# Patient Record
Sex: FEMALE | Race: WHITE | NOT HISPANIC OR LATINO | Employment: PART TIME | URBAN - METROPOLITAN AREA
[De-identification: names, ages, dates, MRNs, and addresses within clinical notes are randomized per-mention and may not be internally consistent; named-entity substitution may affect disease eponyms.]

---

## 2021-08-31 ENCOUNTER — EVALUATION (OUTPATIENT)
Dept: PHYSICAL THERAPY | Age: 56
End: 2021-08-31
Payer: COMMERCIAL

## 2021-08-31 DIAGNOSIS — G89.4 CHRONIC PAIN SYNDROME: Primary | ICD-10-CM

## 2021-08-31 PROCEDURE — 97161 PT EVAL LOW COMPLEX 20 MIN: CPT | Performed by: PHYSICAL THERAPIST

## 2021-08-31 NOTE — LETTER
2021    Lillie Jewell MD  [address] [place] [address] [address] [facility]    Patient: Avanell Moritz   YOB: 1965   Date of Visit: 2021     Encounter Diagnosis     ICD-10-CM    1  Chronic pain syndrome  G89 4        Dear Dr Petar Larkin: Thank you for your recent referral of Avanell Moritz  Please review the attached evaluation summary from Treva's recent visit  Please verify that you agree with the plan of care by signing the attached order  If you have any questions or concerns, please do not hesitate to call  I sincerely appreciate the opportunity to share in the care of one of your patients and hope to have another opportunity to work with you in the near future  Sincerely,    Pelon Hernandez, PT      Referring Provider:      I certify that I have read the below Plan of Care and certify the need for these services furnished under this plan of treatment while under my care  Lillie Jewell MD  [address] [place] [id number]  Via Fax: [phone]          PT Evaluation     Today's date: 2021  Patient name: Avanell Moritz  : 1965  MRN: [id number]  Referring provider: Judd Islas MD  Dx:   Encounter Diagnosis     ICD-10-CM    1  Chronic pain syndrome  G89 4                   Assessment  Assessment details: Pt reports to PT with cc of diffuse, chronic pain in lumbar, cervical B knee, feet and hand regions  Pt has decreased LE strength and lumbar ROM and would benefit from skilled PT in order to decreased difficulty with ADLs     Impairments: abnormal coordination, abnormal muscle firing, abnormal muscle tone, abnormal or restricted ROM, activity intolerance, impaired physical strength, lacks appropriate home exercise program, pain with function and poor body mechanics    Goals  In 4 weeks pt will:  -Be independent with phase I of HEP  -Increase LE strength by 1/2 grade  -Increase LE ROM by 10 degrees    By discharge pt will:  -Be independent with Phase II of HEP  -Demonstrate full LE strength  -Demonstrate full LE ROM  -Report minimal pain with ADLs    Plan  Patient would benefit from: skilled physical therapy  Planned therapy interventions: joint mobilization, manual therapy, abdominal trunk stabilization, motor coordination training, muscle pump exercises, aquatic therapy, neuromuscular re-education, therapeutic activities, therapeutic exercise, stretching, strengthening, patient education, functional ROM exercises and home exercise program  Frequency: 1x week  Duration in weeks: 6  Plan of Care beginning date: 8/31/2021  Plan of Care expiration date: 10/12/2021  Treatment plan discussed with: patient and PTA        Subjective Evaluation    History of Present Illness  Mechanism of injury: Pt reports to PT with cc of diffuse, chronic pain in lumbar, cervical B knee, feet and hand regions  Pt has overall difficulty with ADLs, including work at            Objective     Passive Range of Motion     Additional Passive Range of Motion Details  Lumbar ROM as % of normal ROM    Flex:75  Ext:50  R ROT:50  L ROT:50      Strength/Myotome Testing     Left Hip   Planes of Motion   Abduction: 3+    Right Hip   Planes of Motion   Abduction: 3+    Left Knee   Extension: 4    Right Knee   Extension: 4    Left Ankle/Foot   Dorsiflexion: 3+  Plantar flexion: 3+    Right Ankle/Foot   Dorsiflexion: 3+  Plantar flexion: 3+             Precautions: Chronic pain      Manuals                                                                 Neuro Re-Ed                                                                                                        Ther Ex                                                                                                                     Ther Activity                                       Gait Training                                       Modalities

## 2021-08-31 NOTE — PROGRESS NOTES
PT Evaluation     Today's date: 2021  Patient name: Robin Love  : 1965  MRN: [id number]  Referring provider: Jatinder Oakley MD  Dx:   Encounter Diagnosis     ICD-10-CM    1  Chronic pain syndrome  G89 4                   Assessment  Assessment details: Pt reports to PT with cc of diffuse, chronic pain in lumbar, cervical B knee, feet and hand regions  Pt has decreased LE strength and lumbar ROM and would benefit from skilled PT in order to decreased difficulty with ADLs  Impairments: abnormal coordination, abnormal muscle firing, abnormal muscle tone, abnormal or restricted ROM, activity intolerance, impaired physical strength, lacks appropriate home exercise program, pain with function and poor body mechanics    Goals  In 4 weeks pt will:  -Be independent with phase I of HEP  -Increase LE strength by 1/2 grade  -Increase LE ROM by 10 degrees    By discharge pt will:  -Be independent with Phase II of HEP  -Demonstrate full LE strength  -Demonstrate full LE ROM  -Report minimal pain with ADLs    Plan  Patient would benefit from: skilled physical therapy  Planned therapy interventions: joint mobilization, manual therapy, abdominal trunk stabilization, motor coordination training, muscle pump exercises, aquatic therapy, neuromuscular re-education, therapeutic activities, therapeutic exercise, stretching, strengthening, patient education, functional ROM exercises and home exercise program  Frequency: 1x week  Duration in weeks: 6  Plan of Care beginning date: 2021  Plan of Care expiration date: 10/12/2021  Treatment plan discussed with: patient and PTA        Subjective Evaluation    History of Present Illness  Mechanism of injury: Pt reports to PT with cc of diffuse, chronic pain in lumbar, cervical B knee, feet and hand regions  Pt has overall difficulty with ADLs, including work at            Objective     Passive Range of Motion     Additional Passive Range of Motion Details  Lumbar ROM as % of normal ROM    Flex:75  Ext:50  R ROT:50  L ROT:50      Strength/Myotome Testing     Left Hip   Planes of Motion   Abduction: 3+    Right Hip   Planes of Motion   Abduction: 3+    Left Knee   Extension: 4    Right Knee   Extension: 4    Left Ankle/Foot   Dorsiflexion: 3+  Plantar flexion: 3+    Right Ankle/Foot   Dorsiflexion: 3+  Plantar flexion: 3+             Precautions: Chronic pain      Manuals                                                                 Neuro Re-Ed                                                                                                        Ther Ex                                                                                                                     Ther Activity                                       Gait Training                                       Modalities

## 2021-09-09 ENCOUNTER — OFFICE VISIT (OUTPATIENT)
Dept: PHYSICAL THERAPY | Age: 56
End: 2021-09-09
Payer: COMMERCIAL

## 2021-09-09 DIAGNOSIS — G89.4 CHRONIC PAIN SYNDROME: Primary | ICD-10-CM

## 2021-09-09 PROCEDURE — 97113 AQUATIC THERAPY/EXERCISES: CPT | Performed by: PHYSICAL THERAPIST

## 2021-09-09 NOTE — PROGRESS NOTES
Daily Note     Today's date: 2021  Patient name: Alayna Whalen  : 1965  MRN: [id number]  Referring provider: Court Goncalves MD  Dx:   Encounter Diagnosis     ICD-10-CM    1  Chronic pain syndrome  G89 4                   Subjective: Pt reports minimal changes from IE      Objective: See treatment diary below      Assessment: Tolerated treatment well  Patient demonstrated fatigue post treatment, would benefit from continued PT and pt tolerated first treatment well, but stated she had soreness in B hips following session      Plan: Continue per plan of care  Progress treatment as tolerated         Precautions: Chronic pain      Manuals                                                                 Neuro Re-Ed                                                                                                        Ther Ex POOL            Laps 5/5            HS/piriformis stretch 5x30s ea            Hip flex/abd  2x10 ea            HR 2x10            Pool pony bike 5'            Ball press down 5" x10 L/R/C            Turtle flex  x10 L/R/C                                                                                                       Ther Activity                                       Gait Training                                       Modalities

## 2021-09-14 ENCOUNTER — OFFICE VISIT (OUTPATIENT)
Dept: PHYSICAL THERAPY | Age: 56
End: 2021-09-14
Payer: COMMERCIAL

## 2021-09-14 DIAGNOSIS — G89.4 CHRONIC PAIN SYNDROME: Primary | ICD-10-CM

## 2021-09-14 PROCEDURE — 97113 AQUATIC THERAPY/EXERCISES: CPT | Performed by: SPECIALIST/TECHNOLOGIST

## 2021-09-14 NOTE — PROGRESS NOTES
Daily Note     Today's date: 2021  Patient name: Katherine Ortega  : 1965  MRN: [id number]  Referring provider: Sally Pires MD  Dx:   Encounter Diagnosis     ICD-10-CM    1  Chronic pain syndrome  G89 4                   Subjective: Pt reports she was fairly sore in B hips  following previous tx session that lasted 2-3 days  Pt also states she had a follow up with rheumatology and she was formally diagnosed with RA and received hip injections which have provided some relief  Objective: See treatment diary below    Assessment: No LE progressions to POC this visit given pt's reported soreness following initial treatment session  Tolerated treatment well  Patient demonstrated fatigue post treatment and exhibited good technique with therapeutic exercises and would benefit from continued PT  Plan: Continue per plan of care  Progress treatment as tolerated         Precautions: Chronic pain      Manuals                                                                Neuro Re-Ed                                                                                                        Ther Ex POOL POOL           Laps            HS/piriformis stretch 5x30s ea 5x30s           Hip flex/abd  2x10 ea 2x10 ea           HR 2x10 20x            Pool pony bike 5' 5 min           Ball press down 5" x10 L/R/C 5" x15 L/R/C           Turtle flex  x10 L/R/C x20 L/R/C                        Paddle Biking  2 min FW/BW           4 Way UE Paddles  20x ea                                                               Ther Activity                                       Gait Training                                       Modalities

## 2021-09-21 ENCOUNTER — OFFICE VISIT (OUTPATIENT)
Dept: PHYSICAL THERAPY | Age: 56
End: 2021-09-21
Payer: COMMERCIAL

## 2021-09-21 DIAGNOSIS — G89.4 CHRONIC PAIN SYNDROME: Primary | ICD-10-CM

## 2021-09-21 PROCEDURE — 97113 AQUATIC THERAPY/EXERCISES: CPT | Performed by: PHYSICAL THERAPIST

## 2021-09-21 NOTE — PROGRESS NOTES
Daily Note     Today's date: 2021  Patient name: Lara Villalobos  : 1965  MRN: [id number]  Referring provider: Yaya Heard MD  Dx:   Encounter Diagnosis     ICD-10-CM    1  Chronic pain syndrome  G89 4                   Subjective: Pt reports overall decrease in pain from IE      Objective: See treatment diary below      Assessment: Tolerated treatment well  Patient demonstrated fatigue post treatment and would benefit from continued PT      Plan: Continue per plan of care  Progress treatment as tolerated         Precautions: Chronic pain      Manuals                                                               Neuro Re-Ed                                                                                                        Ther Ex POOL POOL           Laps           HS/piriformis stretch 5x30s ea 5x30s 5x30s          Hip flex/abd  2x10 ea 2x10 ea           HR 2x10 20x  20x          Pool pony bike 5' 5 min 5'          Ball press down 5" x10 L/R/C 5" x15 L/R/C 5" x15 L/R/C          Turtle flex  x10 L/R/C x20 L/R/C x20 L/R/C                       Paddle Biking  2 min FW/BW 2 min FW/BW          4 Way UE Paddles  20x ea                                                               Ther Activity                                       Gait Training                                       Modalities

## 2021-09-28 ENCOUNTER — OFFICE VISIT (OUTPATIENT)
Dept: PHYSICAL THERAPY | Age: 56
End: 2021-09-28
Payer: COMMERCIAL

## 2021-09-28 DIAGNOSIS — G89.4 CHRONIC PAIN SYNDROME: Primary | ICD-10-CM

## 2021-09-28 PROCEDURE — 97113 AQUATIC THERAPY/EXERCISES: CPT | Performed by: SPECIALIST/TECHNOLOGIST

## 2021-09-28 NOTE — PROGRESS NOTES
Daily Note     Today's date: 2021  Patient name: Dipika Petersen  : 1965  MRN: [id number]  Referring provider: Zak Nicole MD  Dx:   Encounter Diagnosis     ICD-10-CM    1  Chronic pain syndrome  G89 4                   Subjective: Pt reports B hip soreness as she has been going on more walks  Objective: See treatment diary below    Assessment: No progressions with LE therex given reported soreness  Tolerated treatment well  Patient demonstrated fatigue post treatment, exhibited good technique with therapeutic exercises and would benefit from continued PT    Plan: Continue per plan of care  Progress treatment as tolerated         Precautions: Chronic pain      Manuals                                                              Neuro Re-Ed                                                                                                        Ther Ex POOL POOL           Laps          HS/piriformis stretch 5x30s ea 5x30s 5x30s 5x30s         Hip flex/abd  2x10 ea 2x10 ea  20x ea         HR 2x10 20x  20x 20x         Pool pony bike 5' 5 min 5' 5'         Ball press down 5" x10 L/R/C 5" x15 L/R/C 5" x15 L/R/C 5" x20         Turtle flex  x10 L/R/C x20 L/R/C x20 L/R/C x20 L/R/C                      Paddle Biking  2 min FW/BW 2 min FW/BW 2 min FW/BW         4 Way UE Paddles  20x ea  30x ea                                                             Ther Activity                                       Gait Training                                       Modalities

## 2021-10-05 ENCOUNTER — OFFICE VISIT (OUTPATIENT)
Dept: PHYSICAL THERAPY | Age: 56
End: 2021-10-05
Payer: COMMERCIAL

## 2021-10-05 DIAGNOSIS — G89.4 CHRONIC PAIN SYNDROME: Primary | ICD-10-CM

## 2021-10-05 PROCEDURE — 97113 AQUATIC THERAPY/EXERCISES: CPT | Performed by: PHYSICAL THERAPIST

## 2021-10-12 ENCOUNTER — OFFICE VISIT (OUTPATIENT)
Dept: PHYSICAL THERAPY | Age: 56
End: 2021-10-12
Payer: COMMERCIAL

## 2021-10-12 DIAGNOSIS — G89.4 CHRONIC PAIN SYNDROME: Primary | ICD-10-CM

## 2021-10-12 PROCEDURE — 97113 AQUATIC THERAPY/EXERCISES: CPT | Performed by: SPECIALIST/TECHNOLOGIST

## 2021-10-19 ENCOUNTER — OFFICE VISIT (OUTPATIENT)
Dept: PHYSICAL THERAPY | Age: 56
End: 2021-10-19
Payer: COMMERCIAL

## 2021-10-19 DIAGNOSIS — G89.4 CHRONIC PAIN SYNDROME: Primary | ICD-10-CM

## 2021-10-19 PROCEDURE — 97113 AQUATIC THERAPY/EXERCISES: CPT | Performed by: PHYSICAL THERAPIST

## 2021-12-20 ENCOUNTER — PROBLEM (OUTPATIENT)
Dept: URBAN - METROPOLITAN AREA CLINIC 6 | Facility: CLINIC | Age: 56
End: 2021-12-20

## 2021-12-20 DIAGNOSIS — H16.223: ICD-10-CM

## 2021-12-20 DIAGNOSIS — H25.13: ICD-10-CM

## 2021-12-20 PROCEDURE — 1036F TOBACCO NON-USER: CPT

## 2021-12-20 PROCEDURE — 92004 COMPRE OPH EXAM NEW PT 1/>: CPT

## 2021-12-20 PROCEDURE — G8427 DOCREV CUR MEDS BY ELIG CLIN: HCPCS

## 2021-12-20 ASSESSMENT — TONOMETRY
OD_IOP_MMHG: 15
OS_IOP_MMHG: 15

## 2021-12-20 ASSESSMENT — VISUAL ACUITY
OU_SC: J3
OD_CC: 20/20
OS_CC: 20/30+2

## 2022-03-21 ENCOUNTER — FOLLOW UP (OUTPATIENT)
Dept: URBAN - METROPOLITAN AREA CLINIC 6 | Facility: CLINIC | Age: 57
End: 2022-03-21

## 2022-03-21 DIAGNOSIS — H16.223: ICD-10-CM

## 2022-03-21 DIAGNOSIS — H25.13: ICD-10-CM

## 2022-03-21 PROCEDURE — 92012 INTRM OPH EXAM EST PATIENT: CPT

## 2022-03-21 ASSESSMENT — VISUAL ACUITY
OU_CC: J3
OD_CC: 20/20
OS_CC: 20/25

## 2022-03-21 ASSESSMENT — TONOMETRY
OS_IOP_MMHG: 22
OD_IOP_MMHG: 20

## 2022-09-14 ENCOUNTER — 6 MONTH FOLLOW UP (OUTPATIENT)
Dept: URBAN - METROPOLITAN AREA CLINIC 6 | Facility: CLINIC | Age: 57
End: 2022-09-14

## 2022-09-14 DIAGNOSIS — D31.31: ICD-10-CM

## 2022-09-14 DIAGNOSIS — H25.13: ICD-10-CM

## 2022-09-14 DIAGNOSIS — H16.223: ICD-10-CM

## 2022-09-14 PROCEDURE — 92201 OPSCPY EXTND RTA DRAW UNI/BI: CPT

## 2022-09-14 PROCEDURE — 92014 COMPRE OPH EXAM EST PT 1/>: CPT

## 2022-09-14 ASSESSMENT — TONOMETRY
OS_IOP_MMHG: 18
OD_IOP_MMHG: 17

## 2022-09-14 ASSESSMENT — VISUAL ACUITY
OD_SC: 20/20
OU_SC: J1
OS_SC: 20/30-2

## 2023-09-18 ENCOUNTER — FOLLOW UP (OUTPATIENT)
Dept: URBAN - METROPOLITAN AREA CLINIC 6 | Facility: CLINIC | Age: 58
End: 2023-09-18

## 2023-09-18 DIAGNOSIS — H04.123: ICD-10-CM

## 2023-09-18 DIAGNOSIS — H52.13: ICD-10-CM

## 2023-09-18 DIAGNOSIS — D31.31: ICD-10-CM

## 2023-09-18 DIAGNOSIS — H02.834: ICD-10-CM

## 2023-09-18 DIAGNOSIS — H25.13: ICD-10-CM

## 2023-09-18 DIAGNOSIS — H02.831: ICD-10-CM

## 2023-09-18 PROCEDURE — 92014 COMPRE OPH EXAM EST PT 1/>: CPT

## 2023-09-18 PROCEDURE — 92015 DETERMINE REFRACTIVE STATE: CPT

## 2023-09-18 PROCEDURE — 92250 FUNDUS PHOTOGRAPHY W/I&R: CPT

## 2023-09-18 ASSESSMENT — VISUAL ACUITY
OD_CC: 20/25-2
OS_CC: 20/20-2

## 2023-09-18 ASSESSMENT — TONOMETRY
OD_IOP_MMHG: 17
OS_IOP_MMHG: 18

## 2023-10-24 ENCOUNTER — CONTACT LENS FITTING (OUTPATIENT)
Dept: URBAN - METROPOLITAN AREA CLINIC 6 | Facility: CLINIC | Age: 58
End: 2023-10-24

## 2023-10-24 DIAGNOSIS — H52.13: ICD-10-CM

## 2023-10-24 PROCEDURE — 92310 CONTACT LENS FITTING OU: CPT

## 2023-10-24 ASSESSMENT — VISUAL ACUITY
OS_PH: 20/60
OS_CC: 20/150
OD_CC: 20/30-2

## 2024-09-18 ENCOUNTER — ESTABLISHED COMPREHENSIVE EXAM (OUTPATIENT)
Dept: URBAN - METROPOLITAN AREA CLINIC 6 | Facility: CLINIC | Age: 59
End: 2024-09-18

## 2024-09-18 DIAGNOSIS — H04.123: ICD-10-CM

## 2024-09-18 DIAGNOSIS — H02.834: ICD-10-CM

## 2024-09-18 DIAGNOSIS — H25.13: ICD-10-CM

## 2024-09-18 DIAGNOSIS — H02.831: ICD-10-CM

## 2024-09-18 DIAGNOSIS — D31.31: ICD-10-CM

## 2024-09-18 PROCEDURE — 92250 FUNDUS PHOTOGRAPHY W/I&R: CPT

## 2024-09-18 PROCEDURE — 92014 COMPRE OPH EXAM EST PT 1/>: CPT

## 2024-09-18 ASSESSMENT — VISUAL ACUITY
OD_CC: 20/25
OS_CC: 20/25
OS_SC: J1+
OD_CC: 20/20

## 2024-09-18 ASSESSMENT — TONOMETRY
OS_IOP_MMHG: 16
OD_IOP_MMHG: 18

## (undated) RX ORDER — CYCLOSPORINE 0.5 MG/ML: 1 EMULSION OPHTHALMIC TWICE A DAY